# Patient Record
Sex: FEMALE
[De-identification: names, ages, dates, MRNs, and addresses within clinical notes are randomized per-mention and may not be internally consistent; named-entity substitution may affect disease eponyms.]

---
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
## 2021-06-04 ENCOUNTER — HOSPITAL ENCOUNTER (EMERGENCY)
Dept: HOSPITAL 5 - ED | Age: 21
Discharge: LEFT BEFORE DISCHARGE | End: 2021-06-04
Payer: SELF-PAY

## 2021-06-04 DIAGNOSIS — N93.9: Primary | ICD-10-CM

## 2021-06-04 DIAGNOSIS — Z53.21: ICD-10-CM
